# Patient Record
Sex: MALE | Race: BLACK OR AFRICAN AMERICAN | NOT HISPANIC OR LATINO | Employment: UNEMPLOYED | URBAN - METROPOLITAN AREA
[De-identification: names, ages, dates, MRNs, and addresses within clinical notes are randomized per-mention and may not be internally consistent; named-entity substitution may affect disease eponyms.]

---

## 2017-12-02 ENCOUNTER — HOSPITAL ENCOUNTER (EMERGENCY)
Facility: HOSPITAL | Age: 32
Discharge: HOME | End: 2017-12-02
Attending: EMERGENCY MEDICINE | Admitting: EMERGENCY MEDICINE
Payer: COMMERCIAL

## 2017-12-02 ENCOUNTER — APPOINTMENT (EMERGENCY)
Dept: RADIOLOGY | Facility: HOSPITAL | Age: 32
End: 2017-12-02
Payer: COMMERCIAL

## 2017-12-02 VITALS
HEART RATE: 82 BPM | OXYGEN SATURATION: 98 % | WEIGHT: 205 LBS | SYSTOLIC BLOOD PRESSURE: 146 MMHG | RESPIRATION RATE: 18 BRPM | BODY MASS INDEX: 24.21 KG/M2 | TEMPERATURE: 97.9 F | DIASTOLIC BLOOD PRESSURE: 86 MMHG | HEIGHT: 77 IN

## 2017-12-02 DIAGNOSIS — M26.609 TMJ (TEMPOROMANDIBULAR JOINT DISORDER): Primary | ICD-10-CM

## 2017-12-02 PROCEDURE — 70355 PANORAMIC X-RAY OF JAWS: CPT

## 2017-12-02 PROCEDURE — 99283 EMERGENCY DEPT VISIT LOW MDM: CPT

## 2017-12-02 PROCEDURE — 96372 THER/PROPH/DIAG INJ SC/IM: CPT

## 2017-12-02 RX ORDER — TRAMADOL HYDROCHLORIDE 50 MG/1
50 TABLET ORAL EVERY 6 HOURS PRN
COMMUNITY
End: 2020-03-16 | Stop reason: ALTCHOICE

## 2017-12-02 RX ORDER — ACETAMINOPHEN 325 MG/1
650 TABLET ORAL EVERY 6 HOURS PRN
COMMUNITY
End: 2020-03-16 | Stop reason: ALTCHOICE

## 2017-12-02 RX ORDER — KETOROLAC TROMETHAMINE 30 MG/ML
15 INJECTION, SOLUTION INTRAMUSCULAR; INTRAVENOUS ONCE
Status: COMPLETED | OUTPATIENT
Start: 2017-12-02 | End: 2017-12-02

## 2017-12-02 RX ORDER — NAPROXEN 500 MG/1
500 TABLET ORAL 2 TIMES DAILY WITH MEALS
Qty: 30 TABLET | Refills: 0 | Status: SHIPPED | OUTPATIENT
Start: 2017-12-02 | End: 2020-03-16 | Stop reason: ALTCHOICE

## 2017-12-02 RX ORDER — ALBUTEROL SULFATE 90 UG/1
2 AEROSOL, METERED RESPIRATORY (INHALATION) EVERY 6 HOURS PRN
COMMUNITY
End: 2020-03-16 | Stop reason: ALTCHOICE

## 2017-12-02 RX ORDER — IBUPROFEN 400 MG/1
600 TABLET ORAL EVERY 8 HOURS PRN
COMMUNITY
End: 2020-03-16 | Stop reason: ALTCHOICE

## 2017-12-02 RX ADMIN — KETOROLAC TROMETHAMINE 15 MG: 30 INJECTION, SOLUTION INTRAMUSCULAR at 13:39

## 2017-12-02 NOTE — ED PROVIDER NOTES
History  Chief Complaint   Patient presents with    Post-op Problem     Patient states, "I had TMJ surgery a couple months ago in August and last night when I woke up the right side of my jaw and face is swollen and it feels like my jaw keeps slipping out of place and popping back and forth"; pt  denies fevers      This is an otherwise healthy 69-year-old male who presents with right jaw pain and swelling  The patient states that he had TMJ surgery at [facility] approximately 3 months ago  No issues since the surgery  However, the patient woke up last night noticed right facial swelling and right jaw pain at his TMJ joint  No trauma to the area  States that whenever tries to open his mouth it feels like his right jaw is dislocating  Patient complains of pain at the right TMJ when he clenches his teeth  No fevers, ear pain, throat swelling, difficulty swallowing, shortness of breath, stridor, tongue swelling, neck swelling  He has taken tramadol, Motrin, and Tylenol without much relief  States that he has difficulty eating because of the pain  Denies alcohol or drug use  Denies fever/chills, nausea/vomiting, lightheadedness/dizziness, numbness/weakness, headache, change in vision, URI symptoms, neck pain, chest pain, palpitations, shortness of breath, cough, back pain, flank pain, abdominal pain, diarrhea, hematochezia, melena, dysuria, hematuria  Prior to Admission Medications   Prescriptions Last Dose Informant Patient Reported? Taking?    NORTRIPTYLINE HCL PO   Yes Yes   Sig: Take 100 mg by mouth daily at bedtime     acetaminophen (TYLENOL) 325 mg tablet   Yes Yes   Sig: Take 650 mg by mouth every 6 (six) hours as needed for mild pain   albuterol (PROVENTIL HFA,VENTOLIN HFA) 90 mcg/act inhaler   Yes Yes   Sig: Inhale 2 puffs every 6 (six) hours as needed for wheezing   ibuprofen (MOTRIN) 400 mg tablet   Yes Yes   Sig: Take 600 mg by mouth every 8 (eight) hours as needed for mild pain traMADol (ULTRAM) 50 mg tablet   Yes Yes   Sig: Take 50 mg by mouth every 6 (six) hours as needed for moderate pain      Facility-Administered Medications: None       Past Medical History:   Diagnosis Date    Asthma        Past Surgical History:   Procedure Laterality Date    KNEE SURGERY         History reviewed  No pertinent family history  I have reviewed and agree with the history as documented  Social History   Substance Use Topics    Smoking status: Current Every Day Smoker     Packs/day: 0 50     Types: Cigarettes    Smokeless tobacco: Never Used    Alcohol use No        Review of Systems   Constitutional: Negative for chills, fatigue and fever  HENT: Positive for dental problem and facial swelling  Negative for congestion, ear discharge, ear pain, rhinorrhea, sore throat and trouble swallowing  Eyes: Negative for photophobia and visual disturbance  Respiratory: Negative for cough, chest tightness and shortness of breath  Cardiovascular: Negative for chest pain, palpitations and leg swelling  Gastrointestinal: Negative for abdominal pain, blood in stool, diarrhea, nausea and vomiting  Endocrine: Negative for polyuria  Genitourinary: Negative for dysuria, flank pain and hematuria  Musculoskeletal: Negative for back pain and neck pain  Skin: Negative for color change and rash  Allergic/Immunologic: Negative for immunocompromised state  Neurological: Negative for dizziness, weakness, light-headedness, numbness and headaches  All other systems reviewed and are negative        Physical Exam  ED Triage Vitals [12/02/17 1244]   Temperature Pulse Respirations Blood Pressure SpO2   97 9 °F (36 6 °C) 101 18 155/85 100 %      Temp Source Heart Rate Source Patient Position - Orthostatic VS BP Location FiO2 (%)   Oral Monitor Sitting Left arm --      Pain Score       8           Orthostatic Vital Signs  Vitals:    12/02/17 1244   BP: 155/85   Pulse: 101   Patient Position - Orthostatic VS: Sitting       Physical Exam   Constitutional: Vital signs are normal  He appears well-developed and well-nourished  He is cooperative  No distress  HENT:   Right Ear: Hearing, tympanic membrane, external ear and ear canal normal  No mastoid tenderness  Left Ear: Hearing, tympanic membrane, external ear and ear canal normal  No mastoid tenderness  Mouth/Throat: Uvula is midline and oropharynx is clear and moist    No evidence of facial swelling  Tenderness over right TMJ  Healed scar anterior to right ear  Jaw is even  Patient able to clench his jaw  No evidence of jaw dislocation when patient opens mouth  Normal dentition  No evidence of abscess  Throat is clear without swelling  No tongue swelling  No mastoid tenderness on right  Eyes: Conjunctivae, EOM and lids are normal  Pupils are equal, round, and reactive to light  Neck: Trachea normal  No thyroid mass and no thyromegaly present  Cardiovascular: Normal rate, regular rhythm, normal heart sounds, intact distal pulses and normal pulses  No murmur heard  Pulmonary/Chest: Effort normal and breath sounds normal    Abdominal: Soft  Normal appearance and bowel sounds are normal  There is no tenderness  There is no rebound, no guarding, no CVA tenderness and negative Hernández's sign  Neurological: He is alert  Skin: Skin is warm, dry and intact  Psychiatric: He has a normal mood and affect  His speech is normal and behavior is normal  Thought content normal        ED Medications  Medications   ketorolac (TORADOL) 30 mg/mL injection 15 mg (15 mg Intramuscular Given 12/2/17 1339)       Diagnostic Studies  Results Reviewed     None                 XR mandible panorex Remona Dom only)   ED Interpretation by Jennifer Yung MD (12/02 9584)   No acute fracture/dislocation    Normal exam             Procedures  Procedures      Phone Consults  ED Phone Contact    ED Course  ED Course                                MDM  Number of Diagnoses or Management Options  Diagnosis management comments: No evidence of jaw dislocation at this time  However, I will check a Panorex  I will treat his pain with Toradol  The patient will be discharged with OMFS follow-up, as well as supportive care  Soft diet until pain subsides  CritCare Time    Disposition  Final diagnoses:   TMJ (temporomandibular joint disorder)     Time reflects when diagnosis was documented in both MDM as applicable and the Disposition within this note     Time User Action Codes Description Comment    12/2/2017  1:54 PM Griffin CORDOVA Add [M26 380] TMJ (temporomandibular joint disorder)       ED Disposition     ED Disposition Condition Comment    Discharge  Power Campos discharge to home/self care  Condition at discharge: Stable        Follow-up Information     Follow up With Specialties Details Why Contact Info Ramon Cox  Call in 1 day for follow up [phone]       [address] Emergency Department Emergency Medicine Go to If symptoms worsen [address] [facility] ED, [address], [address] [facility], [facility] Oral Maxillofacial Surgery Call in 2 days for follow up appointment [address]  [address] [facility] [address]           Patient's Medications   Discharge Prescriptions    NAPROXEN (NAPROSYN) 500 MG TABLET    Take 1 tablet by mouth 2 (two) times a day with meals       Start Date: 12/2/2017 End Date: --       Order Dose: 500 mg       Quantity: 30 tablet    Refills: 0     No discharge procedures on file  ED Provider  Attending physically available and evaluated Tito Pruitt I managed the patient along with the ED Attending      Electronically Signed by         Pilar Brown MD  Resident  12/02/17 9489

## 2017-12-02 NOTE — DISCHARGE INSTRUCTIONS
Temporomandibular Disorder   WHAT YOU NEED TO KNOW:   Temporomandibular disorder is a condition that causes pain in your jaw  The disorder affects the joint between your temporal bone and your mandible (jawbone)  The muscles and nerves around the joint are also affected  DISCHARGE INSTRUCTIONS:   Medicines:   · Pain medicine: You may be given a prescription medicine to decrease pain  Do not wait until the pain is severe before you take this medicine  · NSAIDs:  These medicines decrease swelling and pain  You can buy NSAIDs without a doctor's order  Ask your healthcare provider which medicine is right for you, and how much to take  Take as directed  NSAIDs can cause stomach bleeding or kidney problems if not taken correctly  · Muscle relaxers  help decrease pain and muscle spasms  · Take your medicine as directed  Contact your healthcare provider if you think your medicine is not helping or if you have side effects  Tell him of her if you are allergic to any medicine  Keep a list of the medicines, vitamins, and herbs you take  Include the amounts, and when and why you take them  Bring the list or the pill bottles to follow-up visits  Carry your medicine list with you in case of an emergency  Follow up with your healthcare provider as directed:  Write down your questions so you remember to ask them during your visits  Manage your symptoms:   · Eat soft foods: Your healthcare provider may suggest that you eat only soft foods for several days  A dietitian may work with you to find foods that are easier to bite, chew, or swallow  Examples are soup, applesauce, cottage cheese, pudding, yogurt, and soft fruits  · Use jaw supporting devices:  Splints may be used to support your jaw or keep your jaw from moving  You may need to wear a mouth guard to keep you from clenching or grinding your teeth while you are sleeping  · Use ice and heat:  Ice helps decrease swelling and pain   Ice may also help prevent tissue damage  Use an ice pack, or put crushed ice in a plastic bag  Cover it with a towel and place it on your jaw for 15 to 20 minutes every hour or as directed  After the first 24 to 48 hours, use heat to decrease pain, swelling, and muscle spasms  Apply heat on the area for 20 to 30 minutes every 2 hours for as many days as directed  Use a heating pad, moist warm compress, or a hot water bottle  · Go to physical therapy:  A physical therapist teaches you exercises to help improve movement and strength, and to decrease pain in your jaw  A speech therapist may help you with swallowing and speech exercises  Contact your healthcare provider if:   · You have a fever  · Your splint or mouth guard is loose  · You have questions or concerns about your condition or care  Return to the emergency department if:   · You have nausea, are vomiting, or cannot keep liquids down  · You have pain that does not go away even after you take your pain medicine  · You have problems breathing, talking, drinking, eating, or swallowing  · Your splint or mouth guard gets damaged or broken  © 2017 [facility] Information is for End User's use only and may not be sold, redistributed or otherwise used for commercial purposes  All illustrations and images included in CareNotes® are the copyrighted property of A D A M , Inc  or Delonte Kaufman  The above information is an  only  It is not intended as medical advice for individual conditions or treatments  Talk to your doctor, nurse or pharmacist before following any medical regimen to see if it is safe and effective for you

## 2017-12-02 NOTE — ED ATTENDING ATTESTATION
Morenita Moreno MD, saw and evaluated the patient  I have discussed the patient with the resident/non-physician practitioner and agree with the resident's/non-physician practitioner's findings, Plan of Care, and MDM as documented in the resident's/non-physician practitioner's note, except where noted  All available labs and Radiology studies were reviewed  At this point I agree with the current assessment done in the Emergency Department  I have conducted an independent evaluation of this patient a history and physical is as follows:      Critical Care Time  CritCare Time    28 yo male c/o right jaw pain and swelling  Pt had tmj surgery 3 months ago at [facility] and noted yesterday woke up with pain at site with feeling of dislocation of right tmj with right facial swelling  No trauma  No fever, no numbness  No ear pain, no facial droop, no trouble swallowing or breathing  pmh asthma  Vss, afebrile, lungs cta, rrr, right tmj tenderness, able to close mouth, no swelling noted, tm clear, no dental tenderness  Panorex, likely tmj pain, nsaids

## 2020-03-15 ENCOUNTER — HOSPITAL ENCOUNTER (EMERGENCY)
Facility: HOSPITAL | Age: 35
Discharge: HOME | End: 2020-03-16
Attending: EMERGENCY MEDICINE | Admitting: EMERGENCY MEDICINE
Payer: COMMERCIAL

## 2020-03-15 VITALS
DIASTOLIC BLOOD PRESSURE: 80 MMHG | BODY MASS INDEX: 25.31 KG/M2 | OXYGEN SATURATION: 100 % | TEMPERATURE: 98.7 F | HEART RATE: 79 BPM | WEIGHT: 213.4 LBS | SYSTOLIC BLOOD PRESSURE: 139 MMHG | RESPIRATION RATE: 18 BRPM

## 2020-03-15 DIAGNOSIS — J06.9 VIRAL UPPER RESPIRATORY TRACT INFECTION: Primary | ICD-10-CM

## 2020-03-15 PROCEDURE — 99283 EMERGENCY DEPT VISIT LOW MDM: CPT

## 2020-03-15 PROCEDURE — 99284 EMERGENCY DEPT VISIT MOD MDM: CPT | Performed by: EMERGENCY MEDICINE

## 2020-03-15 RX ORDER — IBUPROFEN 400 MG/1
800 TABLET ORAL ONCE
Status: COMPLETED | OUTPATIENT
Start: 2020-03-16 | End: 2020-03-16

## 2020-03-15 RX ORDER — LORATADINE 10 MG/1
10 TABLET ORAL DAILY
Qty: 20 TABLET | Refills: 0 | Status: SHIPPED | OUTPATIENT
Start: 2020-03-15

## 2020-03-15 RX ORDER — NAPROXEN 500 MG/1
500 TABLET ORAL 2 TIMES DAILY WITH MEALS
Qty: 30 TABLET | Refills: 0 | Status: SHIPPED | OUTPATIENT
Start: 2020-03-15

## 2020-03-15 RX ORDER — ECHINACEA PURPUREA EXTRACT 125 MG
1 TABLET ORAL ONCE
Status: COMPLETED | OUTPATIENT
Start: 2020-03-16 | End: 2020-03-16

## 2020-03-15 RX ORDER — LORATADINE 10 MG/1
10 TABLET ORAL ONCE
Status: COMPLETED | OUTPATIENT
Start: 2020-03-16 | End: 2020-03-16

## 2020-03-15 RX ORDER — FLUTICASONE PROPIONATE 50 MCG
1 SPRAY, SUSPENSION (ML) NASAL DAILY
Qty: 16 G | Refills: 0 | Status: SHIPPED | OUTPATIENT
Start: 2020-03-15

## 2020-03-15 RX ORDER — ACETAMINOPHEN 325 MG/1
975 TABLET ORAL ONCE
Status: COMPLETED | OUTPATIENT
Start: 2020-03-16 | End: 2020-03-16

## 2020-03-16 RX ADMIN — SALINE NASAL SPRAY 1 SPRAY: 1.5 SOLUTION NASAL at 00:05

## 2020-03-16 RX ADMIN — ACETAMINOPHEN 975 MG: 325 TABLET ORAL at 00:05

## 2020-03-16 RX ADMIN — LORATADINE 10 MG: 10 TABLET ORAL at 00:05

## 2020-03-16 RX ADMIN — IBUPROFEN 800 MG: 400 TABLET ORAL at 00:04

## 2020-03-16 NOTE — ED PROVIDER NOTES
History  Chief Complaint   Patient presents with    Cold Like Symptoms     pt states that he just got off the bus from [place]  pt states that he is coughing sneezing, chills and the metal plate in his jaw is clicking      26 yo male with asthma s/p right TMJ surgery 2 months ago presents to the ED complaining of URI symptoms x several days  The patient reports rhinorrhea, nasal congestion, postnasal drip, and chills  No cough, shortness of breath, or wheezing  No fevers  He has also experiencing a "clicking" sensation in his right jaw (the repair site) for the past several weeks and wants to know "what's going on there"  No difficulty eating/chewing  He just got off a bus from [place] and is supposed to check into an alcohol rehab facility tomorrow morning  No other specific complaints  None       Past Medical History:   Diagnosis Date    Asthma        Past Surgical History:   Procedure Laterality Date    HERNIA REPAIR  2010    w/mesh    KNEE SURGERY Left 2015    Torn meniscus repair    TEMPOROMANDIBULAR JOINT ARTHROPLASTY  08/2017       Family History   Problem Relation Age of Onset    Diabetes type II Maternal Grandmother         Mellitus    Heart disease Family         Cardiovascular    Cancer Family         Unknown     I have reviewed and agree with the history as documented  E-Cigarette/Vaping    E-Cigarette Use Never User      E-Cigarette/Vaping Substances     Social History     Tobacco Use    Smoking status: Current Every Day Smoker     Packs/day: 0 50     Types: Cigarettes    Smokeless tobacco: Never Used    Tobacco comment: Per [employer]: Heavy tobacco smoker   Substance Use Topics    Alcohol use: Yes     Frequency: 2-3 times a week    Drug use: Yes     Types: Marijuana, Cocaine       Review of Systems   Constitutional: Positive for chills  Negative for fever  HENT: Positive for congestion, postnasal drip and rhinorrhea   Negative for facial swelling, sinus pressure, sinus pain, sore throat, trouble swallowing and voice change  Eyes: Negative for visual disturbance  Respiratory: Negative for cough and shortness of breath  Cardiovascular: Negative for chest pain and palpitations  Gastrointestinal: Negative for abdominal pain, diarrhea, nausea and vomiting  Endocrine: Negative for cold intolerance and heat intolerance  Genitourinary: Negative for dysuria and flank pain  Musculoskeletal: Negative for back pain  Skin: Negative for rash  Allergic/Immunologic: Negative for immunocompromised state  Neurological: Negative for dizziness, light-headedness and headaches  Hematological: Negative for adenopathy  Psychiatric/Behavioral: The patient is not nervous/anxious  Physical Exam  Physical Exam   Constitutional: He is oriented to person, place, and time  He appears well-developed and well-nourished  No distress  HENT:   Head: Normocephalic and atraumatic  Right Ear: Tympanic membrane normal    Left Ear: Tympanic membrane normal    Nose: Rhinorrhea present  Right sinus exhibits no maxillary sinus tenderness and no frontal sinus tenderness  Left sinus exhibits no maxillary sinus tenderness and no frontal sinus tenderness  Mouth/Throat: Uvula is midline, oropharynx is clear and moist and mucous membranes are normal  No tonsillar exudate  Eyes: Pupils are equal, round, and reactive to light  EOM are normal    Neck: Normal range of motion  Neck supple  Cardiovascular: Normal rate and regular rhythm  Pulmonary/Chest: Effort normal and breath sounds normal  No respiratory distress  Abdominal: Soft  He exhibits no distension  There is no tenderness  Musculoskeletal: Normal range of motion  He exhibits no edema  Neurological: He is alert and oriented to person, place, and time  Skin: Skin is warm and dry  Psychiatric: He has a normal mood and affect         Vital Signs  ED Triage Vitals [03/15/20 2343]   Temperature Pulse Respirations Blood Pressure SpO2   98 7 °F (37 1 °C) 79 18 139/80 100 %      Temp Source Heart Rate Source Patient Position - Orthostatic VS BP Location FiO2 (%)   Tympanic Monitor Sitting Left arm --      Pain Score       --           Vitals:    03/15/20 2343   BP: 139/80   Pulse: 79   Patient Position - Orthostatic VS: Sitting         Visual Acuity      ED Medications  Medications   ibuprofen (MOTRIN) tablet 800 mg (800 mg Oral Given 3/16/20 0004)   acetaminophen (TYLENOL) tablet 975 mg (975 mg Oral Given 3/16/20 0005)   sodium chloride (OCEAN) 0 65 % nasal spray 1 spray (1 spray Each Nare Given 3/16/20 0005)   loratadine (CLARITIN) tablet 10 mg (10 mg Oral Given 3/16/20 0005)       Diagnostic Studies  Results Reviewed     None                 No orders to display              Procedures  Procedures         ED Course                                 MDM  Number of Diagnoses or Management Options  Viral upper respiratory tract infection:   Diagnosis management comments: The patient is very well appearing with stable vital signs  No fever, cough, or shortness of breath  Symptoms are likely secondary to a mild viral URI  Plan for supportive care and follow up with a local PCP  Jaw "clicking" does not seem to be an acute issue --> contact information for ENT provided for further workup as an outpatient  The patient is agreeable to this plan  Strict return precautions provided  Patient Progress  Patient progress: stable        Disposition  Final diagnoses:   Viral upper respiratory tract infection     Time reflects when diagnosis was documented in both MDM as applicable and the Disposition within this note     Time User Action Codes Description Comment    3/15/2020 11:56 PM Abel Lemons Add [J06 9] Viral upper respiratory tract infection       ED Disposition     ED Disposition Condition Date/Time Comment    Discharge Stable Sun Mar 15, 2020 11:56 PM Power Quiroz discharge to home/self care              Follow-up Information     Follow up With Specialties Details Why Contact Info Additional [address] Family Medicine Schedule an appointment as soon as possible for a visit   [address], [address] [facility] [address]  [address], [address], [address] [place], [place], 593 [facility] ENT Otolaryngology Schedule an appointment as soon as possible for a visit   [address] [facility] [address]  [address] ENT, [address] [place], [place], [address] [phone]          Discharge Medication List as of 3/15/2020 11:58 PM      START taking these medications    Details   fluticasone (FLONASE) 50 mcg/act nasal spray 1 spray into each nostril daily, Starting Sun 3/15/2020, Print      loratadine (CLARITIN) 10 mg tablet Take 1 tablet (10 mg total) by mouth daily, Starting Sun 3/15/2020, Print      !! naproxen (NAPROSYN) 500 mg tablet Take 1 tablet (500 mg total) by mouth 2 (two) times a day with meals, Starting Sun 3/15/2020, Print       !! - Potential duplicate medications found  Please discuss with provider        CONTINUE these medications which have NOT CHANGED    Details   acetaminophen (TYLENOL) 325 mg tablet Take 650 mg by mouth every 6 (six) hours as needed for mild pain, Historical Med      albuterol (PROVENTIL HFA,VENTOLIN HFA) 90 mcg/act inhaler Inhale 2 puffs every 6 (six) hours as needed for wheezing, Historical Med      ibuprofen (MOTRIN) 400 mg tablet Take 600 mg by mouth every 8 (eight) hours as needed for mild pain, Historical Med      !! naproxen (NAPROSYN) 500 mg tablet Take 1 tablet by mouth 2 (two) times a day with meals, Starting Sat 12/2/2017, Print      NORTRIPTYLINE HCL PO Take 100 mg by mouth daily at bedtime  , Historical Med      traMADol (ULTRAM) 50 mg tablet Take 50 mg by mouth every 6 (six) hours as needed for moderate pain, Historical Med       !! - Potential duplicate medications found  Please discuss with provider  No discharge procedures on file      PDMP Review     None          ED Provider  Electronically Signed by           Callie Arguello MD  03/16/20 6998

## 2020-07-07 ENCOUNTER — APPOINTMENT (EMERGENCY)
Dept: RADIOLOGY | Facility: HOSPITAL | Age: 35
End: 2020-07-07
Payer: COMMERCIAL

## 2020-07-07 ENCOUNTER — HOSPITAL ENCOUNTER (EMERGENCY)
Facility: HOSPITAL | Age: 35
Discharge: HOME | End: 2020-07-07
Attending: EMERGENCY MEDICINE | Admitting: EMERGENCY MEDICINE
Payer: COMMERCIAL

## 2020-07-07 ENCOUNTER — APPOINTMENT (EMERGENCY)
Dept: RADIOLOGY | Facility: CLINIC | Age: 35
End: 2020-07-07
Payer: COMMERCIAL

## 2020-07-07 VITALS
TEMPERATURE: 98.2 F | HEART RATE: 87 BPM | DIASTOLIC BLOOD PRESSURE: 62 MMHG | RESPIRATION RATE: 18 BRPM | OXYGEN SATURATION: 100 % | SYSTOLIC BLOOD PRESSURE: 138 MMHG

## 2020-07-07 DIAGNOSIS — R05.9 COUGH: Primary | ICD-10-CM

## 2020-07-07 LAB — SARS-COV-2 RNA RESP QL NAA+PROBE: NEGATIVE

## 2020-07-07 PROCEDURE — 71045 X-RAY EXAM CHEST 1 VIEW: CPT

## 2020-07-07 PROCEDURE — 87635 SARS-COV-2 COVID-19 AMP PRB: CPT | Performed by: EMERGENCY MEDICINE

## 2020-07-07 PROCEDURE — 99283 EMERGENCY DEPT VISIT LOW MDM: CPT

## 2020-07-07 PROCEDURE — 99285 EMERGENCY DEPT VISIT HI MDM: CPT | Performed by: EMERGENCY MEDICINE

## 2020-07-08 ENCOUNTER — HOSPITAL ENCOUNTER (EMERGENCY)
Facility: HOSPITAL | Age: 35
Discharge: HOME | End: 2020-07-08
Attending: EMERGENCY MEDICINE | Admitting: EMERGENCY MEDICINE
Payer: COMMERCIAL

## 2020-07-08 VITALS
DIASTOLIC BLOOD PRESSURE: 76 MMHG | RESPIRATION RATE: 16 BRPM | SYSTOLIC BLOOD PRESSURE: 124 MMHG | TEMPERATURE: 98.3 F | HEART RATE: 70 BPM | OXYGEN SATURATION: 99 %

## 2020-07-08 DIAGNOSIS — F22 PARANOIA (HCC): Primary | ICD-10-CM

## 2020-07-08 LAB
AMPHETAMINES SERPL QL SCN: POSITIVE
BARBITURATES UR QL: NEGATIVE
BENZODIAZ UR QL: NEGATIVE
COCAINE UR QL: POSITIVE
ETHANOL EXG-MCNC: 0 MG/DL
METHADONE UR QL: NEGATIVE
OPIATES UR QL SCN: NEGATIVE
OXYCODONE+OXYMORPHONE UR QL SCN: NEGATIVE
PCP UR QL: POSITIVE
THC UR QL: NEGATIVE

## 2020-07-08 PROCEDURE — 80307 DRUG TEST PRSMV CHEM ANLYZR: CPT | Performed by: EMERGENCY MEDICINE

## 2020-07-08 PROCEDURE — 99284 EMERGENCY DEPT VISIT MOD MDM: CPT

## 2020-07-08 PROCEDURE — 99283 EMERGENCY DEPT VISIT LOW MDM: CPT | Performed by: EMERGENCY MEDICINE

## 2020-07-08 PROCEDURE — 82075 ASSAY OF BREATH ETHANOL: CPT | Performed by: EMERGENCY MEDICINE

## 2020-07-08 RX ORDER — LORAZEPAM 1 MG/1
1 TABLET ORAL ONCE
Status: COMPLETED | OUTPATIENT
Start: 2020-07-08 | End: 2020-07-08

## 2020-07-08 RX ORDER — ALBUTEROL SULFATE 90 UG/1
2 AEROSOL, METERED RESPIRATORY (INHALATION) ONCE
Status: COMPLETED | OUTPATIENT
Start: 2020-07-08 | End: 2020-07-08

## 2020-07-08 RX ADMIN — ALBUTEROL SULFATE 2 PUFF: 90 AEROSOL, METERED RESPIRATORY (INHALATION) at 16:18

## 2020-07-08 RX ADMIN — LORAZEPAM 1 MG: 1 TABLET ORAL at 20:47

## 2020-07-08 RX ADMIN — ALBUTEROL SULFATE 2 PUFF: 90 AEROSOL, METERED RESPIRATORY (INHALATION) at 12:06

## 2020-07-08 NOTE — ED PROVIDER NOTES
History  Chief Complaint   Patient presents with    Cough     Pt reports cough and change in voice for two days  Hx of asthma  Denies fever  A 69-year-old male with past medical history significant for asthma; presents with a nonproductive cough for the past 2 days  Patient does report associated shortness of breath  Patient has also noticed a change in his voice  Patient denies associated fever, chills, chest pain, abdominal pain, nausea, vomiting, diarrhea, peripheral edema and rashes  Patient denies sick contacts  Assessment & plan:  Cough, associated with shortness of breath  Patient resting comfortably, no acute distress  Lungs clear to auscultation  Likely viral etiology  Will obtain chest x-ray and COVID swab for further evaluation  History provided by:  Patient  Cough   Associated symptoms: shortness of breath        Prior to Admission Medications   Prescriptions Last Dose Informant Patient Reported? Taking?   fluticasone (FLONASE) 50 mcg/act nasal spray   No No   Si spray into each nostril daily   loratadine (CLARITIN) 10 mg tablet   No No   Sig: Take 1 tablet (10 mg total) by mouth daily   naproxen (NAPROSYN) 500 mg tablet   No No   Sig: Take 1 tablet (500 mg total) by mouth 2 (two) times a day with meals      Facility-Administered Medications: None       Past Medical History:   Diagnosis Date    Asthma        Past Surgical History:   Procedure Laterality Date    HERNIA REPAIR      w/mesh    KNEE SURGERY Left 2015    Torn meniscus repair    TEMPOROMANDIBULAR JOINT ARTHROPLASTY  2017       Family History   Problem Relation Age of Onset    Diabetes type II Maternal Grandmother         Mellitus    Heart disease Family         Cardiovascular    Cancer Family         Unknown     I have reviewed and agree with the history as documented      E-Cigarette/Vaping    E-Cigarette Use Never User      E-Cigarette/Vaping Substances     Social History     Tobacco Use    Smoking status: Current Every Day Smoker     Packs/day: 0 50     Types: Cigarettes    Smokeless tobacco: Never Used    Tobacco comment: Per [employer]: Heavy tobacco smoker   Substance Use Topics    Alcohol use: Yes     Frequency: 2-3 times a week    Drug use: Yes     Types: Marijuana, Cocaine       Review of Systems   Respiratory: Positive for cough and shortness of breath  All other systems reviewed and are negative  Physical Exam  Physical Exam  General Appearance: alert and oriented, nad, non toxic appearing  Skin:  Warm, dry, intact  HEENT: atraumatic, normocephalic  Neck: Supple, trachea midline  Cardiac: RRR; no murmurs, rub, gallops  Pulmonary: lungs CTAB; no wheezes, rales, rhonchi  Gastrointestinal: abdomen soft, nontender, nondistended; no guarding or rebound tenderness; good bowel sounds, no mass or bruits  Extremities:  no pedal edema, 2+ pulses; no calf tenderness, no clubbing, no cyanosis  Neuro:  no focal motor or sensory deficits, CN 2-12 grossly intact  Psych:  Normal mood and affect, normal judgement and insight      Vital Signs  ED Triage Vitals [07/07/20 2127]   Temperature Pulse Respirations Blood Pressure SpO2   98 2 °F (36 8 °C) 87 18 138/62 100 %      Temp Source Heart Rate Source Patient Position - Orthostatic VS BP Location FiO2 (%)   Temporal Monitor -- Right arm --      Pain Score       --           Vitals:    07/07/20 2127   BP: 138/62   Pulse: 87         Visual Acuity      ED Medications  Medications - No data to display    Diagnostic Studies  Results Reviewed     Procedure Component Value Units Date/Time    Novel Coronavirus [facility] [[id number]]  (Normal) Collected:  07/07/20 2203    Lab Status:  Final result Specimen:  Nares from Nose Updated:  07/07/20 2304     SARS-CoV-2 Negative    Narrative:        The specimen collection materials, transport medium, and/or testing methodology utilized in the production of these test results have been proven to be reliable in a limited validation with an abbreviated program under the Emergency Utilization Authorization provided by the FDA  Testing reported as "Presumptive positive" will be confirmed with secondary testing with a reference laboratory to ensure result accuracy  Clinical caution and judgement should be used with the interpretation of these results with consideration of the clinical impression and other laboratory testing  Testing reported as "Positive" or "Negative" has been proven to be accurate according to standard laboratory validation requirements  All testing is performed with control materials showing appropriate reactivity at standard intervals  XR chest 1 view portable   ED Interpretation by [id number] Loy Adams DO (07/07 2222)   No acute disease                 Procedures  Procedures         ED Course  ED Course as of Jul 07 2344   Tue Jul 07, 2020   2310 EXT SARS-COV-2: Negative                                             MDM      Disposition  Final diagnoses:   Cough     Time reflects when diagnosis was documented in both MDM as applicable and the Disposition within this note     Time User Action Codes Description Comment    7/7/2020 11:10 PM Perez Whitlock Add [R05] Cough       ED Disposition     ED Disposition Condition Date/Time Comment    Discharge Stable Tue Jul 7, 2020 11:10 PM Power Raygoza discharge to home/self care              Follow-up Information     Follow up With Specialties Details Why Contact Info Additional [address] Family Medicine Schedule an appointment as soon as possible for a visit  To establish care with a family physician [address], [address] [facility] [phone]  [address], [address], [address] [place], [place], [address] [facility] [address]          Discharge Medication List as of 7/7/2020 11:10 PM      CONTINUE these medications which have NOT CHANGED    Details fluticasone (FLONASE) 50 mcg/act nasal spray 1 spray into each nostril daily, Starting Sun 3/15/2020, Print      loratadine (CLARITIN) 10 mg tablet Take 1 tablet (10 mg total) by mouth daily, Starting Sun 3/15/2020, Print      naproxen (NAPROSYN) 500 mg tablet Take 1 tablet (500 mg total) by mouth 2 (two) times a day with meals, Starting Sun 3/15/2020, Print           No discharge procedures on file      PDMP Review     None          ED Provider  Electronically Signed by           Christian Grace DO  07/07/20 9079

## 2020-07-08 NOTE — ED NOTES
Pt moved to arreola bed as he does not require medical monitoring, pt only awaiting Crisis magalys Cruz, MORENA  07/08/20 8568

## 2020-07-08 NOTE — ED CARE HANDOFF
Emergency Department Sign Out Note        Sign out and transfer of care from Dr Talita Baez  See Separate Emergency Department note  The patient, Marianne Soto, was evaluated by the previous provider for paranoia  Workup Completed:  Labs Reviewed   RAPID DRUG SCREEN, URINE - Abnormal       Result Value Ref Range Status    Amph/Meth UR Positive (*) Negative Final    Barbiturate Ur Negative  Negative Final    Benzodiazepine Urine Negative  Negative Final    Cocaine Urine Positive (*) Negative Final    Methadone Urine Negative  Negative Final    Opiate Urine Negative  Negative Final    PCP Ur Positive (*) Negative Final    THC Urine Negative  Negative Final    Oxycodone Urine Negative  Negative Final    Narrative:     Presumptive report  If requested, specimen will be sent to reference lab for confirmation  FOR MEDICAL PURPOSES ONLY  IF CONFIRMATION NEEDED PLEASE CONTACT THE LAB WITHIN 5 DAYS  Drug Screen Cutoff Levels:  AMPHETAMINE/METHAMPHETAMINES  1000 ng/mL  BARBITURATES     200 ng/mL  BENZODIAZEPINES     200 ng/mL  COCAINE      300 ng/mL  METHADONE      300 ng/mL  OPIATES      300 ng/mL  PHENCYCLIDINE     25 ng/mL  THC       50 ng/mL  OXYCODONE      100 ng/mL   POCT ALCOHOL BREATH TEST - Normal    EXTBreath Alcohol 0 00   Final     No orders to display         ED Course / Workup Pending (followup):                        ED Course as of Jul 12 1652 Wed Jul 08, 2020   0630 Assumed care from Dr Talita Baez  Pt will need to be seen and evaluated by Crisis this am        Procedures  MDM    Disposition  Final diagnoses:   Paranoia ([address] )     Time reflects when diagnosis was documented in both MDM as applicable and the Disposition within this note     Time User Action Codes Description Comment    7/8/2020  6:26 AM Sarina PEREZ Add [F22] Paranoia [facility])       ED Disposition     ED Disposition Condition Date/Time Comment    Discharge Good Wed Jul 8, 2020  8:49 PM Power Elver Thai discharge to home/self care  Follow-up Information     Follow up With Specialties Details Why Contact Info Additional [address] Family Medicine Schedule an appointment as soon as possible for a visit in 2 days  [address], [address] [facility] [phone]  30 [address], [address], 1000 [facility], [place], [place], [address]        Discharge Medication List as of 7/8/2020  8:49 PM      CONTINUE these medications which have NOT CHANGED    Details   loratadine (CLARITIN) 10 mg tablet Take 1 tablet (10 mg total) by mouth daily, Starting Sun 3/15/2020, Print      fluticasone (FLONASE) 50 mcg/act nasal spray 1 spray into each nostril daily, Starting Sun 3/15/2020, Print      naproxen (NAPROSYN) 500 mg tablet Take 1 tablet (500 mg total) by mouth 2 (two) times a day with meals, Starting Sun 3/15/2020, Print           No discharge procedures on file         ED Provider  Electronically Signed by     Candelaria Louis DO  07/12/20 7831

## 2020-07-08 NOTE — ED NOTES
Spoke by phone with Angie Chase at [facility], patient will be transported by [facility] between 2030 to 2045 this evening

## 2020-07-08 NOTE — ED NOTES
Contacted HOST at [phone], spoke with Stephani Gambino, who reported patient is still being reviewed by [facility]  HOST will call with an update once they hear back from [facility]

## 2020-07-08 NOTE — ED NOTES
Pt is laying in bed sleeping, no signs of discomfort or distress noted      Sheila Lyons RN  07/08/20 0907

## 2020-07-08 NOTE — ED NOTES
Attempted to wake patient so crisis assessment could be completed, but he would only wake momentarily, mumble and then fall back asleep  He did answer a few questions, such as that he is from [place], won't say why he is in [facility], but says people have been trying to find him since he came here and he knows they were outside the hospital earlier tonight  He reports that he uses drugs, and says mostly he uses crack  He did not say when he last used or how much, etc He said no when asked if he is suicidal or homicidal   He then turned over, fell asleep, began snoring and crisis worker was unable to wake him and get any verbal responses from him after that  His uds is still pending because he has not given a sample

## 2020-07-08 NOTE — ED NOTES
Phone call from [facility] Admissions worker Shanon Cunningham [phone], reported patient has been accepted for 3 5 rehab at [facility] in [place], [place]  Pt is in agreement for treatment at this location and Nilo Rich was made aware  Nilo Rich reported transport time would be 2200 this evening at the earliest  She will call back and confirm transport time

## 2020-07-08 NOTE — ED NOTES
Pt requesting albuterol inhaler, states he uses one at home when he needs it   Physician aware      Jessica Barrios RN  07/08/20 9286

## 2020-07-08 NOTE — ED PROVIDER NOTES
History  Chief Complaint   Patient presents with    Psychiatric Evaluation     Pt states he is hearing and seeing people following him since earlier today  Denies HI/SI  26 yo male who presents back to ER for being paranoid people are after him ever since trip back on bus from [facility] yesterday  Was just here and tested neg for COVID  Shortly after discharge checked back in  Denies SI or HI, just feels people are after him, keeps seeing people "there is someone waiting for me out there, I know it"  When asked if they are saying things to him he states "yeah, they keep talking about that bag right there" referring to his backpack  Unsure if this is social issue and he is homeless, he gets easily agitated with me asking questions but tells me he is from [place] and came here a few months ago  History provided by:  Patient   used: No    Psychiatric Evaluation   Presenting symptoms: paranoid behavior    Presenting symptoms: no hallucinations and no suicidal thoughts    Degree of incapacity (severity):  Mild  Onset quality:  Gradual  Duration:  1 day  Timing:  Constant  Progression:  Unchanged  Chronicity:  New  Context: drug abuse (meth and crack last 2-3 days ago)    Context: not stressful life event    Relieved by:  Nothing  Worsened by:  Nothing  Ineffective treatments:  None tried  Associated symptoms: anxiety (feels paranoid people are following him and talking about him), irritability and poor judgment    Associated symptoms: no abdominal pain, no chest pain and no headaches    Risk factors: hx of mental illness        Prior to Admission Medications   Prescriptions Last Dose Informant Patient Reported?  Taking?   fluticasone (FLONASE) 50 mcg/act nasal spray Not Taking at Unknown time  No No   Si spray into each nostril daily   Patient not taking: Reported on 2020   loratadine (CLARITIN) 10 mg tablet   No Yes   Sig: Take 1 tablet (10 mg total) by mouth daily   naproxen (NAPROSYN) 500 mg tablet Not Taking at Unknown time  No No   Sig: Take 1 tablet (500 mg total) by mouth 2 (two) times a day with meals   Patient not taking: Reported on 7/8/2020      Facility-Administered Medications: None       Past Medical History:   Diagnosis Date    Asthma        Past Surgical History:   Procedure Laterality Date    HERNIA REPAIR  2010    w/mesh    KNEE SURGERY Left 2015    Torn meniscus repair    TEMPOROMANDIBULAR JOINT ARTHROPLASTY  08/2017       Family History   Problem Relation Age of Onset    Diabetes type II Maternal Grandmother         Mellitus    Heart disease Family         Cardiovascular    Cancer Family         Unknown     I have reviewed and agree with the history as documented  E-Cigarette/Vaping    E-Cigarette Use Never User      E-Cigarette/Vaping Substances     Social History     Tobacco Use    Smoking status: Current Every Day Smoker     Packs/day: 0 50     Types: Cigarettes    Smokeless tobacco: Never Used    Tobacco comment: Per [employer]: Heavy tobacco smoker   Substance Use Topics    Alcohol use: Yes     Frequency: 2-3 times a week    Drug use: Yes     Types: Marijuana, Cocaine       Review of Systems   Constitutional: Positive for irritability  Negative for chills and fever  HENT: Negative for congestion and sore throat  Eyes: Negative for visual disturbance  Respiratory: Positive for cough  Negative for shortness of breath and wheezing  Cardiovascular: Negative for chest pain and palpitations  Gastrointestinal: Negative for abdominal pain, diarrhea, nausea and vomiting  Genitourinary: Negative for dysuria  Musculoskeletal: Negative for neck pain and neck stiffness  Skin: Negative for pallor and rash  Neurological: Negative for headaches  Psychiatric/Behavioral: Positive for paranoia  Negative for confusion, hallucinations, sleep disturbance and suicidal ideas   The patient is nervous/anxious (feels paranoid people are following him and talking about him)  All other systems reviewed and are negative  Physical Exam  Physical Exam   Constitutional: He is oriented to person, place, and time  He appears well-developed and well-nourished  No distress  HENT:   Head: Normocephalic and atraumatic  Right Ear: External ear normal    Left Ear: External ear normal    Mouth/Throat: Oropharynx is clear and moist    Eyes: EOM are normal    Neck: Neck supple  Cardiovascular: Normal rate and regular rhythm  No murmur heard  Pulmonary/Chest: Effort normal and breath sounds normal    Abdominal: Soft  Bowel sounds are normal  He exhibits no distension  There is no tenderness  Musculoskeletal: Normal range of motion  He exhibits no edema  Neurological: He is alert and oriented to person, place, and time  Skin: Skin is warm  No rash noted  No pallor  Psychiatric:   Pt falling asleep through most of exam, appears bothered when asking him questions, denies SI or HI, denies hallucinations, convinced someone or a few people are following him   Nursing note and vitals reviewed        Vital Signs  ED Triage Vitals   Temperature Pulse Respirations Blood Pressure SpO2   07/08/20 0125 07/08/20 0126 07/08/20 0126 07/08/20 0126 07/08/20 0126   98 3 °F (36 8 °C) 77 20 110/79 100 %      Temp Source Heart Rate Source Patient Position - Orthostatic VS BP Location FiO2 (%)   07/08/20 0125 07/08/20 0126 07/08/20 0811 07/08/20 0126 --   Oral Monitor Lying Right arm       Pain Score       --                  Vitals:    07/08/20 0811 07/08/20 1027 07/08/20 1629 07/08/20 2024   BP: 113/78 139/85 130/84 124/76   Pulse: 85 75 60 70   Patient Position - Orthostatic VS: Lying  Lying Lying         Visual Acuity      ED Medications  Medications   albuterol (PROVENTIL HFA,VENTOLIN HFA) inhaler 2 puff (2 puffs Inhalation Given 7/8/20 1206)   albuterol (PROVENTIL HFA,VENTOLIN HFA) inhaler 2 puff (2 puffs Inhalation Given 7/8/20 1618)   LORazepam (ATIVAN) tablet 1 mg (1 mg Oral Given 7/8/20 2047)       Diagnostic Studies  Results Reviewed     Procedure Component Value Units Date/Time    Rapid drug screen, urine [[id number]]  (Abnormal) Collected:  07/08/20 1119    Lab Status:  Final result Specimen:  Urine, Clean Catch Updated:  07/08/20 1157     Amph/Meth UR Positive     Barbiturate Ur Negative     Benzodiazepine Urine Negative     Cocaine Urine Positive     Methadone Urine Negative     Opiate Urine Negative     PCP Ur Positive     THC Urine Negative     Oxycodone Urine Negative    Narrative:       Presumptive report  If requested, specimen will be sent to reference lab for confirmation  FOR MEDICAL PURPOSES ONLY  IF CONFIRMATION NEEDED PLEASE CONTACT THE LAB WITHIN 5 DAYS  Drug Screen Cutoff Levels:  AMPHETAMINE/METHAMPHETAMINES  1000 ng/mL  BARBITURATES     200 ng/mL  BENZODIAZEPINES     200 ng/mL  COCAINE      300 ng/mL  METHADONE      300 ng/mL  OPIATES      300 ng/mL  PHENCYCLIDINE     25 ng/mL  THC       50 ng/mL  OXYCODONE      100 ng/mL    POCT alcohol breath test [[id number]]  (Normal) Resulted:  07/08/20 0202    Lab Status:  Final result Updated:  07/08/20 0202     EXTBreath Alcohol 0 00                 No orders to display              Procedures  Procedures         ED Course  ED Course as of Jul 08 2321 Wed Jul 08, 2020   0126 Pt seen and examined  28 yo male who presents back to ER for being paranoid people are after him ever since trip back on bus from [facility] yesterday  Was just here and tsted neg for COVID  Shortly after discharge checked back in  Denies SI or HI, just feels people are after him, keeps seeing people "there is someone waiting for me out there, I know it"  When asked if they are saying things to him he states "yeah, they keep talking about that bag right there" referring to his backpack  Will check ETOH, RUDS and have ED crisis consult  US AUDIT      Most Recent Value   Initial Alcohol Screen: US AUDIT-C    1   How often do you have a drink containing alcohol? 6 Filed at: 07/08/2020 0159   2  How many drinks containing alcohol do you have on a typical day you are drinking? 5 Filed at: 07/08/2020 0159   3a  Male UNDER 65: How often do you have five or more drinks on one occasion? 6 Filed at: 07/08/2020 0159   Audit-C Score  (!) 17 Filed at: 07/08/2020 0159   Full Alcohol Screen: US AUDIT   4  How often during the last year have you found that you were not able to stop drinking once you had started? 4 Filed at: 07/08/2020 0159   5  How often during past year have you failed to do what was normally expected of you because of drinking? 3 Filed at: 07/08/2020 0159   6  How often in past year have you needed a first drink in the morning to get yourself going after a heavy drinking session? 4 Filed at: 07/08/2020 0159   7  How often in past year have you had feeling of guilt or remorse after drinking? 0 Filed at: 07/08/2020 0159   8  How often in past year have you been unable to remember what happened night before because you had been drinking? 0 Filed at: 07/08/2020 0159   9  Have you or someone else been injured as a result of your drinking? 0 Filed at: 07/08/2020 0159   10  Has a relative, friend, doctor or other health worker been concerned about your drinking and suggested you cut down? 4 Filed at: 07/08/2020 0159   AUDIT Total Score  (!) 32 Filed at: 07/08/2020 0159                  CHRISTINA/DAST-10      Most Recent Value   How many times in the past year have you    Used an illegal drug or used a prescription medication for non-medical reasons? Daily or Almost Daily Filed at: 07/08/2020 0202   In the past 12 months      1  Have you used drugs other than those required for medical reasons? 1 Filed at: 07/08/2020 0202   2  Do you use more than one drug at a time? 1 Filed at: 07/08/2020 0202   3  Have you had medical problems as a result of your drug use (e g , memory loss, hepatitis, convulsions, bleeding, etc )?   1 Filed at: 07/08/2020 0202   4  Have you had "blackouts" or "flashbacks" as a result of drug use? YesNo  1 Filed at: 07/08/2020 0202   5  Do you ever feel bad or guilty about your drug use? 1 Filed at: 07/08/2020 0202   6  Does your spouse (or parent) ever complain about your involvement with drugs? 1 Filed at: 07/08/2020 0202   7  Have you neglected your family because of your use of drugs? 1 Filed at: 07/08/2020 0202   8  Have you engaged in illegal activities in order to obtain drugs? (S) -- [refuses to answer] Filed at: 07/08/2020 0202   9  Have you ever experienced withdrawal symptoms (felt sick) when you stopped taking drugs? 1 Filed at: 07/08/2020 0202   10  Are you always able to stop using drugs when you want to?  0 Filed at: 07/08/2020 0202                                MDM      Disposition  Final diagnoses:   [facility])     Time reflects when diagnosis was documented in both MDM as applicable and the Disposition within this note     Time User Action Codes Description Comment    7/8/2020  6:26 AM Laury PEREZ Add [F22] [facility])       ED Disposition     ED Disposition Condition Date/Time Comment    Discharge Good Wed Jul 8, 2020  8:49 PM Power Sarmiento Leak discharge to home/self care              Follow-up Information     Follow up With Specialties Details Why Contact Info Additional [address] Family Medicine Schedule an appointment as soon as possible for a visit in 2 days  [address], [address] [facility] [phone]  [address], [address], [address], [address] [place] [address]          Discharge Medication List as of 7/8/2020  8:49 PM      CONTINUE these medications which have NOT CHANGED    Details   loratadine (CLARITIN) 10 mg tablet Take 1 tablet (10 mg total) by mouth daily, Starting Sun 3/15/2020, Print      fluticasone (FLONASE) 50 mcg/act nasal spray 1 spray into each nostril daily, Starting Sun 3/15/2020, Print      naproxen (NAPROSYN) 500 mg tablet Take 1 tablet (500 mg total) by mouth 2 (two) times a day with meals, Starting Sun 3/15/2020, Print           No discharge procedures on file      PDMP Review     None          ED Provider  Electronically Signed by           Shauna Panchal DO  07/08/20 0234

## 2020-07-08 NOTE — ED NOTES
Crisis at bedside      [facility]  07/08/20 6260

## 2020-07-08 NOTE — ED NOTES
Phone call from [facility] Lindsay, reported they are still working on transport, but are struggling with locating a , and  asked if we could transport Pt by Rubbie Pima or Jaya to [place], 60 miles away  This worker reported to [facility] Coy that this would not be likely to be authorized due to distance  [facility] will continue to coordinate transport, and call back

## 2021-08-05 ENCOUNTER — HOSPITAL ENCOUNTER (EMERGENCY)
Facility: HOSPITAL | Age: 36
Discharge: HOME | End: 2021-08-06
Attending: EMERGENCY MEDICINE | Admitting: EMERGENCY MEDICINE
Payer: COMMERCIAL

## 2021-08-05 DIAGNOSIS — F10.10 ALCOHOL ABUSE: Primary | ICD-10-CM

## 2021-08-05 DIAGNOSIS — F19.10 SUBSTANCE ABUSE (HCC): ICD-10-CM

## 2021-08-05 PROCEDURE — 82075 ASSAY OF BREATH ETHANOL: CPT | Performed by: EMERGENCY MEDICINE

## 2021-08-05 PROCEDURE — 99284 EMERGENCY DEPT VISIT MOD MDM: CPT

## 2021-08-05 PROCEDURE — 99284 EMERGENCY DEPT VISIT MOD MDM: CPT | Performed by: EMERGENCY MEDICINE

## 2021-08-06 VITALS
RESPIRATION RATE: 18 BRPM | TEMPERATURE: 98 F | DIASTOLIC BLOOD PRESSURE: 81 MMHG | HEART RATE: 86 BPM | SYSTOLIC BLOOD PRESSURE: 131 MMHG | OXYGEN SATURATION: 96 %

## 2021-08-06 LAB — ETHANOL EXG-MCNC: 0 MG/DL

## 2021-08-06 RX ORDER — IBUPROFEN 600 MG/1
600 TABLET ORAL ONCE
Status: COMPLETED | OUTPATIENT
Start: 2021-08-06 | End: 2021-08-06

## 2021-08-06 RX ADMIN — IBUPROFEN 600 MG: 600 TABLET, FILM COATED ORAL at 06:14

## 2021-08-06 NOTE — ED NOTES
Attempted to obtain BAT from patient however patient sleeping at this time and unable to wake up       Lindsay Miller [facility]  08/05/21 3797

## 2021-08-06 NOTE — ED ATTENDING ATTESTATION
8/5/2021  IClinton DO, saw and evaluated the patient  I have discussed the patient with the resident/non-physician practitioner and agree with the resident's/non-physician practitioner's findings, Plan of Care, and MDM as documented in the resident's/non-physician practitioner's note, except where noted  All available labs and Radiology studies were reviewed  I was present for key portions of any procedure(s) performed by the resident/non-physician practitioner and I was immediately available to provide assistance  At this point I agree with the current assessment done in the Emergency Department  I have conducted an independent evaluation of this patient a history and physical is as follows:    Patient is a 34-year-old male that presents for a detox evaluation  Patient admits to using alcohol along with crack cocaine  Patient denies any other drug use  Denies any suicidal or homicidal ideation, intent or plan  Has no symptoms at this time  On exam the patient is very somnolent  Does arouse to voice but falls back asleep  Is able to provide a brief history  No signs of trauma, respiratory depression or distress  Heart rate is normal and rhythm is regular  Abdomen is soft, nondistended and nontender  Speech is slurred  He denies any SI or HI  Given patient's somnolence with recent drug and alcohol use tonight patient will be observed until he can Breathalyzer and then we can proceed with calling host   ED Course     6:12 AM  Patient awake, alert oriented x3  Patient complaining of some generalized leg pain  Asking for Motrin  Still wants detox  Will sign the patient out to the oncoming physician to follow up on host evaluation      Critical Care Time  Procedures

## 2021-08-06 NOTE — ED PROVIDER NOTES
History  Chief Complaint   Patient presents with    Detox Evaluation     Patient reports alcohol and crack cocaine use  patient falling asleep during triage and unable to walk  poor historian  reports using both today but unable to specifiy amount and at what time  HPI     42-year-old male with past medical history cocaine and alcohol abuse who presents for detox evaluation  Patient states he uses crack cocaine and alcohol daily  He states he last used crack cocaine a few hours ago via inhalation  Patient also states he drank a few beers earlier today  Denies any other drug use  Patient denies any pain or injuries  Denies any fevers, chills, headaches, chest pain, shortness of breath, abdominal pain, nausea, vomiting, or diarrhea  Patient states he has tried to detox on his own in the past  Patient denies any SI or HI  Patient is intermittently falling asleep during my assessment  Prior to Admission Medications   Prescriptions Last Dose Informant Patient Reported?  Taking?   fluticasone (FLONASE) 50 mcg/act nasal spray   No No   Si spray into each nostril daily   Patient not taking: Reported on 2020   loratadine (CLARITIN) 10 mg tablet   No No   Sig: Take 1 tablet (10 mg total) by mouth daily   naproxen (NAPROSYN) 500 mg tablet   No No   Sig: Take 1 tablet (500 mg total) by mouth 2 (two) times a day with meals   Patient not taking: Reported on 2020      Facility-Administered Medications: None       Past Medical History:   Diagnosis Date    Asthma        Past Surgical History:   Procedure Laterality Date    HERNIA REPAIR      w/mesh    KNEE SURGERY Left 2015    Torn meniscus repair    TEMPOROMANDIBULAR JOINT ARTHROPLASTY  2017       Family History   Problem Relation Age of Onset    Diabetes type II Maternal Grandmother         Mellitus    Heart disease Family         Cardiovascular    Cancer Family         Unknown     I have reviewed and agree with the history as documented  E-Cigarette/Vaping    E-Cigarette Use Never User      E-Cigarette/Vaping Substances     Social History     Tobacco Use    Smoking status: Current Every Day Smoker     Packs/day: 0 50     Types: Cigarettes    Smokeless tobacco: Never Used    Tobacco comment: Per [employer]: Heavy tobacco smoker   Vaping Use    Vaping Use: Never used   Substance Use Topics    Alcohol use: Yes    Drug use: Yes     Types: Marijuana, Cocaine        Review of Systems   Constitutional: Negative for chills and fever  HENT: Negative for congestion, rhinorrhea and sore throat  Respiratory: Negative for cough and shortness of breath  Cardiovascular: Negative for chest pain  Gastrointestinal: Negative for abdominal pain, diarrhea, nausea and vomiting  Genitourinary: Negative for dysuria, frequency, hematuria and urgency  Musculoskeletal: Negative for arthralgias and myalgias  Skin: Negative for color change and rash  Neurological: Negative for dizziness, weakness, light-headedness, numbness and headaches  All other systems reviewed and are negative  Physical Exam  ED Triage Vitals   Temperature Pulse Respirations Blood Pressure SpO2   08/05/21 2310 08/05/21 2310 08/05/21 2310 08/05/21 2310 08/05/21 2310   98 °F (36 7 °C) 70 16 142/71 93 %      Temp Source Heart Rate Source Patient Position - Orthostatic VS BP Location FiO2 (%)   08/05/21 2310 08/05/21 2310 08/05/21 2310 08/05/21 2310 --   Oral Monitor Lying Right arm       Pain Score       08/06/21 0614       8             Orthostatic Vital Signs  Vitals:    08/05/21 2310 08/06/21 0615   BP: 142/71 131/81   Pulse: 70 86   Patient Position - Orthostatic VS: Lying Lying       Physical Exam  Vitals and nursing note reviewed  Constitutional:       General: He is not in acute distress  Appearance: Normal appearance  He is well-developed and normal weight  He is not ill-appearing, toxic-appearing or diaphoretic        Comments: Intermittently falling asleep during my assessment, easily awakens to voice  HENT:      Head: Normocephalic and atraumatic  Right Ear: External ear normal       Left Ear: External ear normal       Nose: Nose normal       Mouth/Throat:      Mouth: Mucous membranes are moist       Pharynx: Oropharynx is clear  Eyes:      Extraocular Movements: Extraocular movements intact  Conjunctiva/sclera: Conjunctivae normal       Pupils: Pupils are equal, round, and reactive to light  Cardiovascular:      Rate and Rhythm: Normal rate and regular rhythm  Pulses: Normal pulses  Heart sounds: Normal heart sounds  No murmur heard  No friction rub  No gallop  Pulmonary:      Effort: Pulmonary effort is normal  No respiratory distress  Breath sounds: Normal breath sounds  No wheezing or rales  Abdominal:      General: There is no distension  Palpations: Abdomen is soft  Tenderness: There is no abdominal tenderness  There is no guarding or rebound  Musculoskeletal:         General: No swelling or tenderness  Cervical back: Neck supple  Right lower leg: No edema  Left lower leg: No edema  Skin:     General: Skin is warm and dry  Coloration: Skin is not pale  Findings: No erythema or rash  Neurological:      General: No focal deficit present  Mental Status: He is alert and oriented to person, place, and time  Cranial Nerves: No cranial nerve deficit  Sensory: No sensory deficit  Motor: No weakness     Psychiatric:         Mood and Affect: Mood normal          Behavior: Behavior normal          ED Medications  Medications   ibuprofen (MOTRIN) tablet 600 mg (600 mg Oral Given 8/6/21 0614)       Diagnostic Studies  Results Reviewed     Procedure Component Value Units Date/Time    POCT alcohol breath test [[id number]]  (Normal) Resulted: 08/06/21 0616    Lab Status: Final result Updated: 08/06/21 0616     EXTBreath Alcohol 0 000                 No orders to display Procedures  Procedures      ED Course                                       MDM     42-year-old male with past medical history cocaine and alcohol abuse who presents for detox evaluation for cocaine and alcohol use  Will evaluate with breath alcohol level, patient is not cooperative enough and continued to intermittently fall asleep while trying to obtain BAT, will allow for patient to sober up and will reassess  Will consult HOST to talk with patient in the AM    Signed out to Dr Sherman Herrera, pending HOST evaluation in AM when sober  Disposition  Final diagnoses:   Alcohol abuse   Substance abuse ([address] )     Time reflects when diagnosis was documented in both MDM as applicable and the Disposition within this note     Time User Action Codes Description Comment    8/6/2021 12:37 PM Jennie Farooq Add [F10 10] Alcohol abuse     8/6/2021 12:37 PM Jennie Farooq Add [F19 10] Substance abuse [facility])       ED Disposition     ED Disposition Condition Date/Time Comment    Discharge  Fri Aug 6, 2021  9:01 AM Power Samayoa discharge to home/self care  Follow-up Information     Follow up With Specialties Details Why Contact Info    [employer]  Schedule an appointment as soon as possible for a visit in 1 week  [phone]            Discharge Medication List as of 8/6/2021  8:00 AM      CONTINUE these medications which have NOT CHANGED    Details   fluticasone (FLONASE) 50 mcg/act nasal spray 1 spray into each nostril daily, Starting Sun 3/15/2020, Print      loratadine (CLARITIN) 10 mg tablet Take 1 tablet (10 mg total) by mouth daily, Starting Sun 3/15/2020, Print      naproxen (NAPROSYN) 500 mg tablet Take 1 tablet (500 mg total) by mouth 2 (two) times a day with meals, Starting Sun 3/15/2020, Print           No discharge procedures on file  PDMP Review     None           ED Provider  Attending physically available and evaluated Daron Smith  I managed the patient along with the ED Attending      Electronically Signed by         Chase Black MD  08/06/21 6052

## 2021-08-06 NOTE — ED NOTES
Patient states his insurance only covers a certain area to receive services and he "does not" want to go there  Requesting to be discharged at this time       Elgin Tristan RN  08/06/21 [address], RN  08/06/21 8782

## 2021-08-06 NOTE — ED NOTES
Patient left prior to talking with Physician  Discharge paperwork not given to patient        Elgin Tristan RN  08/06/21 12 [facility] Damion Mccallum RN  08/06/21 2466

## 2024-10-20 ENCOUNTER — HOSPITAL ENCOUNTER (EMERGENCY)
Facility: HOSPITAL | Age: 39
End: 2024-10-20
Attending: EMERGENCY MEDICINE | Admitting: EMERGENCY MEDICINE
Payer: COMMERCIAL

## 2024-10-20 VITALS
OXYGEN SATURATION: 98 % | SYSTOLIC BLOOD PRESSURE: 123 MMHG | BODY MASS INDEX: 24.16 KG/M2 | DIASTOLIC BLOOD PRESSURE: 70 MMHG | WEIGHT: 203.71 LBS | HEART RATE: 80 BPM | TEMPERATURE: 97.8 F | RESPIRATION RATE: 16 BRPM

## 2024-10-20 DIAGNOSIS — F19.10 POLYSUBSTANCE ABUSE (HCC): Primary | ICD-10-CM

## 2024-10-20 LAB
AMPHETAMINES SERPL QL SCN: NEGATIVE
BARBITURATES UR QL: NEGATIVE
BENZODIAZ UR QL: NEGATIVE
COCAINE UR QL: POSITIVE
FENTANYL UR QL SCN: NEGATIVE
HYDROCODONE UR QL SCN: NEGATIVE
METHADONE UR QL: NEGATIVE
OPIATES UR QL SCN: NEGATIVE
OXYCODONE+OXYMORPHONE UR QL SCN: NEGATIVE
PCP UR QL: NEGATIVE
THC UR QL: NEGATIVE

## 2024-10-20 PROCEDURE — 99282 EMERGENCY DEPT VISIT SF MDM: CPT

## 2024-10-20 PROCEDURE — 80307 DRUG TEST PRSMV CHEM ANLYZR: CPT | Performed by: EMERGENCY MEDICINE

## 2024-10-20 PROCEDURE — 99284 EMERGENCY DEPT VISIT MOD MDM: CPT | Performed by: EMERGENCY MEDICINE

## 2024-10-20 NOTE — ED CARE HANDOFF
Emergency Department Sign Out Note        Sign out and transfer of care from Dr. Guevara. See Separate Emergency Department note.     The patient, Power Dela Cruz, was evaluated by the previous provider for HOST placement.    Workup Completed:  Labs Reviewed   RAPID DRUG SCREEN, URINE - Abnormal       Result Value Ref Range Status    Amph/Meth UR Negative  Negative Final    Barbiturate Ur Negative  Negative Final    Benzodiazepine Urine Negative  Negative Final    Cocaine Urine Positive (*) Negative Final    Methadone Urine Negative  Negative Final    Opiate Urine Negative  Negative Final    PCP Ur Negative  Negative Final    THC Urine Negative  Negative Final    Oxycodone Urine Negative  Negative Final    Fentanyl Urine Negative  Negative Final    HYDROCODONE URINE Negative  Negative Final    Narrative:     Presumptive report. If requested, specimen will be sent to reference lab for confirmation.  FOR MEDICAL PURPOSES ONLY.   IF CONFIRMATION NEEDED PLEASE CONTACT THE LAB WITHIN 5 DAYS.    Drug Screen Cutoff Levels:  AMPHETAMINE/METHAMPHETAMINES  1000 ng/mL  BARBITURATES     200 ng/mL  BENZODIAZEPINES     200 ng/mL  COCAINE      300 ng/mL  METHADONE      300 ng/mL  OPIATES      300 ng/mL  PHENCYCLIDINE     25 ng/mL  THC       50 ng/mL  OXYCODONE      100 ng/mL  FENTANYL      5 ng/mL  HYDROCODONE     300 ng/mL   POCT ALCOHOL BREATH TEST     No orders to display         ED Course / Workup Pending (followup):            ED Course as of 10/20/24 1952   Sun Oct 20, 2024   1558 HOST - waiting for      Procedures  Medical Decision Making  Amount and/or Complexity of Data Reviewed  Labs: ordered.            Disposition  Final diagnoses:   Polysubstance abuse (HCC)     Time reflects when diagnosis was documented in both MDM as applicable and the Disposition within this note       Time User Action Codes Description Comment    10/20/2024  7:57 AM Orlando Guevara Add [F19.10] Polysubstance abuse (HCC)           ED Disposition        ED Disposition   Discharge    Condition   Stable    Date/Time   Sun Oct 20, 2024  7:50 PM    Comment   [facility]             Follow-up Information       Follow up With Specialties Details Why Contact Info Additional Information    [facility] Schedule an appointment as soon as possible for a visit in 2 days  [address] [place] [address], [address] [place] [address] [phone] [facility], [address] [place] [address], [place] [address], [place], [place], [address] [phone]          Discharge Medication List as of 10/20/2024 12:31 PM        CONTINUE these medications which have NOT CHANGED    Details   fluticasone (FLONASE) 50 mcg/act nasal spray 1 spray into each nostril daily, Starting Sun 3/15/2020, Print      loratadine (CLARITIN) 10 mg tablet Take 1 tablet (10 mg total) by mouth daily, Starting Sun 3/15/2020, Print      naproxen (NAPROSYN) 500 mg tablet Take 1 tablet (500 mg total) by mouth 2 (two) times a day with meals, Starting Sun 3/15/2020, Print           No discharge procedures on file.       ED Provider  Electronically Signed by     Nahomy Foy DO  10/20/24 1952

## 2024-10-20 NOTE — ED NOTES
Per HOST, pt going to [facility] and will be p/u in approx 10-15 minutes.      Pamella Peralta RN  10/20/24 6656

## 2024-10-20 NOTE — ED NOTES
HOST notified this RN about that they will call back once bed is available for patient      Dom Dubois RN  10/20/24 0809

## 2024-10-20 NOTE — ED NOTES
RN notified that pt will be p/u in short time for transfer to [facility]. Pt dressed,  given d/c instructions and ham sandwich, and placed in WR. RN updated that pt's ride is not expected to arrive until approx 1830. Pt placed back in ED 16. Dr. Guevara informed of same. Diet ordered. Lunch ordered as pt requested.     Pamella Peralta RN  10/20/24 7162

## 2024-10-20 NOTE — ED NOTES
This RN was in contact with HOST with  time since RN was told that  time was 6:30 by HOST. Around 6:48 no call was made by [employer]  at time. RN made call to host with [facility] that   time is now 7:40.       Dom Dubois RN  10/20/24 7091

## 2024-10-20 NOTE — ED NOTES
Sonia from HOST called this writer at this time. Per Sonia, lars is arranging transportation for patient via uber, not available for transport until 1830. HOST will contact ED with official transportation time and vehicle description closer to pickup time. [facility]  if needed.      Cyndy Patel RN  10/20/24 8795

## 2024-10-20 NOTE — ED NOTES
Host called to confirm  at 6:30. Host rep talked to this RN and that ub[employer]  will call ED phone line for      Dom Dubosi RN  10/20/24 6128

## 2024-10-20 NOTE — ED CARE HANDOFF
[facility] Warm Handoff Outcome Note    Patient name Power Dela Cruz  Location ED-16/ED-16 MRN [id number]  Age: 38 y.o.          Plan Type:  Warm Handoff                                                                                    Plan Date: 10/20/2024  Service:  ED Warm Handoff      Substance Use History:  Cocaine, alcohol    Warm Handoff Update:  Accepted IP placement at [facility].    Warm Handoff Outcome: Withdrawal Management

## 2024-10-20 NOTE — ED PROVIDER NOTES
Time reflects when diagnosis was documented in both MDM as applicable and the Disposition within this note       Time User Action Codes Description Comment    10/20/2024  7:57 AM Orlando Guevara Add [F19.10] Polysubstance abuse (HCC)           ED Disposition       ED Disposition   Discharge    Condition   Stable    Date/Time   Sun Oct 20, 2024  7:56 AM    Comment   Power Dela Cruz discharge to home/self care.                   Assessment & Plan       Medical Decision Making  Cocaine and alcohol abuse-will consult host for possible rehab placement    Amount and/or Complexity of Data Reviewed  Labs: ordered.             Medications - No data to display    ED Risk Strat Scores                                               History of Present Illness       Chief Complaint   Patient presents with    Detox Evaluation     Patient came in for treatment of crack/cocaine and alcohol. He has been using for a couple weeks. Last use was 1 hour ago.       Past Medical History:   Diagnosis Date    Alcohol abuse     Asthma     Auditory hallucinations     Cellulitis     L leg    Cocaine use disorder (HCC)     Depression     Myositis     Paranoia (HCC)     Paresthesia     Patellar bursitis of right knee     Polysubstance abuse (HCC)     Suicidal ideation     April 2020    TMJ (temporomandibular joint disorder)       Past Surgical History:   Procedure Laterality Date    HERNIA REPAIR  2010    w/mesh    KNEE SURGERY Left 2015    Torn meniscus repair    TEMPOROMANDIBULAR JOINT ARTHROPLASTY  08/2017      Family History   Problem Relation Age of Onset    Diabetes type II Maternal Grandmother         Mellitus    Heart disease Family         Cardiovascular    Cancer Family         Unknown      Social History     Tobacco Use    Smoking status: Every Day     Current packs/day: 0.50     Types: Cigarettes    Smokeless tobacco: Never    Tobacco comments:     Per [employer]: Heavy tobacco smoker   Vaping Use    Vaping status: Never Used   Substance Use  "Topics    Alcohol use: Yes    Drug use: Yes     Types: Marijuana, Cocaine, \"Crack\" cocaine      E-Cigarette/Vaping    E-Cigarette Use Never User       E-Cigarette/Vaping Substances    Nicotine Yes       I have reviewed and agree with the history as documented.     38-year-old male presents requesting rehab for crack cocaine use and alcohol.  Last used several hours ago.  Patient denies suicidal or homicidal thoughts, hallucinations.  Patient is asymptomatic at this time.      History provided by:  Patient  Detox Evaluation  Associated symptoms: no abdominal pain, no palpitations, no seizures, no shortness of breath, no suicidal ideation and no vomiting        Review of Systems   Constitutional:  Negative for chills and fever.   HENT:  Negative for ear pain and sore throat.    Eyes:  Negative for pain and visual disturbance.   Respiratory:  Negative for cough and shortness of breath.    Cardiovascular:  Negative for chest pain and palpitations.   Gastrointestinal:  Negative for abdominal pain and vomiting.   Genitourinary:  Negative for dysuria and hematuria.   Musculoskeletal:  Negative for arthralgias and back pain.   Skin:  Negative for color change and rash.   Neurological:  Negative for seizures and syncope.   Psychiatric/Behavioral:  Negative for suicidal ideas.    All other systems reviewed and are negative.          Objective       ED Triage Vitals [10/20/24 0638]   Temperature Pulse Blood Pressure Respirations SpO2 Patient Position - Orthostatic VS   97.8 °F (36.6 °C) 89 125/84 17 100 % Sitting      Temp Source Heart Rate Source BP Location FiO2 (%) Pain Score    Oral Monitor Right arm -- No Pain      Vitals      Date and Time Temp Pulse SpO2 Resp BP Pain Score FACES Pain Rating User   10/20/24 1215 -- 76 99 % 16 131/77 -- -- NG   10/20/24 1045 -- 82 97 % -- -- -- -- HI   10/20/24 0932 -- 93 100 % 16 141/81 -- -- JT   10/20/24 0725 -- 83 99 % 16 128/80 -- -- JT   10/20/24 0638 97.8 °F (36.6 °C) 89 100 % 17 " 125/84 No Pain -- CFW            Physical Exam  Constitutional:       Appearance: He is well-developed.   HENT:      Head: Normocephalic and atraumatic.   Eyes:      General: No scleral icterus.     Conjunctiva/sclera: Conjunctivae normal.   Neck:      Vascular: No JVD.      Trachea: No tracheal deviation.   Pulmonary:      Effort: Pulmonary effort is normal. No respiratory distress.   Abdominal:      General: There is no distension.   Musculoskeletal:         General: No deformity. Normal range of motion.      Cervical back: Normal range of motion.   Skin:     Coloration: Skin is not pale.      Findings: No erythema or rash.   Neurological:      Mental Status: He is alert and oriented to person, place, and time.   Psychiatric:         Behavior: Behavior normal.         Results Reviewed       Procedure Component Value Units Date/Time    Rapid drug screen, urine [[id number]] Collected: 10/20/24 1216    Lab Status: No result Specimen: Urine, Clean Catch     POCT alcohol breath test [[id number]]     Lab Status: No result             No orders to display       Procedures    ED Medication and Procedure Management   Prior to Admission Medications   Prescriptions Last Dose Informant Patient Reported? Taking?   fluticasone (FLONASE) 50 mcg/act nasal spray   No No   Si spray into each nostril daily   Patient not taking: Reported on 2020   loratadine (CLARITIN) 10 mg tablet   No No   Sig: Take 1 tablet (10 mg total) by mouth daily   naproxen (NAPROSYN) 500 mg tablet   No No   Sig: Take 1 tablet (500 mg total) by mouth 2 (two) times a day with meals   Patient not taking: Reported on 2020      Facility-Administered Medications: None     Patient's Medications   Discharge Prescriptions    No medications on file     No discharge procedures on file.  ED SEPSIS DOCUMENTATION   Time reflects when diagnosis was documented in both MDM as applicable and the Disposition within this note       Time User Action Codes Description  Comment    10/20/2024  7:57 AM Orlando Guevara Add [F19.10] Polysubstance abuse (HCC)                  Orlando Guevara MD  10/20/24 1220